# Patient Record
Sex: FEMALE | Race: WHITE | NOT HISPANIC OR LATINO | Employment: UNEMPLOYED | URBAN - NONMETROPOLITAN AREA
[De-identification: names, ages, dates, MRNs, and addresses within clinical notes are randomized per-mention and may not be internally consistent; named-entity substitution may affect disease eponyms.]

---

## 2021-01-26 ENCOUNTER — ALLIED HEALTH/NURSE VISIT (OUTPATIENT)
Dept: FAMILY MEDICINE | Facility: OTHER | Age: 21
End: 2021-01-26
Attending: FAMILY MEDICINE
Payer: COMMERCIAL

## 2021-01-26 DIAGNOSIS — R43.0 LOSS OF SMELL: Primary | ICD-10-CM

## 2021-01-26 PROCEDURE — U0003 INFECTIOUS AGENT DETECTION BY NUCLEIC ACID (DNA OR RNA); SEVERE ACUTE RESPIRATORY SYNDROME CORONAVIRUS 2 (SARS-COV-2) (CORONAVIRUS DISEASE [COVID-19]), AMPLIFIED PROBE TECHNIQUE, MAKING USE OF HIGH THROUGHPUT TECHNOLOGIES AS DESCRIBED BY CMS-2020-01-R: HCPCS | Mod: ZL | Performed by: FAMILY MEDICINE

## 2021-01-26 PROCEDURE — U0005 INFEC AGEN DETEC AMPLI PROBE: HCPCS | Mod: ZL | Performed by: FAMILY MEDICINE

## 2021-01-26 PROCEDURE — C9803 HOPD COVID-19 SPEC COLLECT: HCPCS

## 2021-01-26 NOTE — PROGRESS NOTES
Patient swabbed for COVID-19 testing.  Marycruz Wilson LPN on 1/26/2021 at 3:18 PM    Loss of smell

## 2021-01-27 LAB
LABORATORY COMMENT REPORT: NORMAL
SARS-COV-2 RNA RESP QL NAA+PROBE: NEGATIVE
SARS-COV-2 RNA RESP QL NAA+PROBE: NORMAL
SPECIMEN SOURCE: NORMAL
SPECIMEN SOURCE: NORMAL

## 2021-03-06 ENCOUNTER — HEALTH MAINTENANCE LETTER (OUTPATIENT)
Age: 21
End: 2021-03-06

## 2021-10-09 ENCOUNTER — HEALTH MAINTENANCE LETTER (OUTPATIENT)
Age: 21
End: 2021-10-09

## 2022-03-26 ENCOUNTER — HEALTH MAINTENANCE LETTER (OUTPATIENT)
Age: 22
End: 2022-03-26

## 2022-05-05 ENCOUNTER — TELEPHONE (OUTPATIENT)
Dept: OTOLARYNGOLOGY | Facility: OTHER | Age: 22
End: 2022-05-05

## 2022-05-05 ENCOUNTER — OFFICE VISIT (OUTPATIENT)
Dept: FAMILY MEDICINE | Facility: OTHER | Age: 22
End: 2022-05-05
Attending: CHIROPRACTOR
Payer: COMMERCIAL

## 2022-05-05 VITALS
TEMPERATURE: 97.8 F | OXYGEN SATURATION: 100 % | WEIGHT: 170 LBS | HEIGHT: 68 IN | BODY MASS INDEX: 25.76 KG/M2 | DIASTOLIC BLOOD PRESSURE: 76 MMHG | RESPIRATION RATE: 16 BRPM | HEART RATE: 95 BPM | SYSTOLIC BLOOD PRESSURE: 120 MMHG

## 2022-05-05 DIAGNOSIS — H66.012 ACUTE SUPPR OTITIS MEDIA W SPON RUPT EAR DRUM, LEFT EAR: Primary | ICD-10-CM

## 2022-05-05 PROCEDURE — 99203 OFFICE O/P NEW LOW 30 MIN: CPT | Performed by: PHYSICIAN ASSISTANT

## 2022-05-05 RX ORDER — AMOXICILLIN 875 MG
875 TABLET ORAL 2 TIMES DAILY
Qty: 20 TABLET | Refills: 0 | Status: SHIPPED | OUTPATIENT
Start: 2022-05-05 | End: 2022-05-15

## 2022-05-05 RX ORDER — OFLOXACIN 3 MG/ML
5 SOLUTION AURICULAR (OTIC) 2 TIMES DAILY
Qty: 4 ML | Refills: 0 | Status: SHIPPED | OUTPATIENT
Start: 2022-05-05 | End: 2022-05-12

## 2022-05-05 ASSESSMENT — PAIN SCALES - GENERAL: PAINLEVEL: MILD PAIN (3)

## 2022-05-05 NOTE — LETTER
[facility]  [address]  [place] [address]  Phone: [phone]  Fax: [phone]    May 5, 2022        Hanane Baron  [id number] [address] [address]          To whom it may concern:    RE: Hanane Baron    Patient was seen and treated today at our clinic and missed work.    Please contact me for questions or concerns.      Sincerely,        Barbara Scales PA-C

## 2022-05-05 NOTE — PROGRESS NOTES
ASSESSMENT/PLAN:    I have reviewed the nursing notes.  I have reviewed the findings, diagnosis, plan and need for follow up with the patient.    1. Acute suppr otitis media w spon rupt ear drum, left ear  - amoxicillin (AMOXIL) 875 MG tablet; Take 1 tablet (875 mg) by mouth 2 times daily for 10 days  Dispense: 20 tablet; Refill: 0  - ofloxacin (FLOXIN) 0.3 % otic solution; Place 5 drops Into the left ear 2 times daily for 7 days  Dispense: 4 mL; Refill: 0  - Otolaryngology Referral; Future  - Vital signs stable. PE consistent with OME/ear infection of left ear(s). Treatment of choice includes antibiotic regimen (Augmentin, alternating tylenol and ibuprofen every 4-6 hours as needed (if able, daily limits reviewed in AVS and verbally with patient), warm compresses, other symptomatic remedies. Avoid trauma to ear(s) such as Q-tips. If symptoms change or worsen, recommend follow up for reevaluation (high fevers, worsening pain, abnormal drainage or odor from ear, etc.). ENT referral placed. Patient is in agreement and understanding of the above treatment plan. All questions and concerns were addressed and answered to patient's satisfaction. AVS reviewed with patient.     Ear drops - Be sure to finish all the medicine, even if you feel better after a few days. When you use ear drops, you should:   Lie on your side or tilt your head, with the affected ear facing up   Make sure the ear drops go into the ear canal   Stay in this position for 20 minutes (after the ear drops are put in)     -Ibuprofen (with food) or acetaminophen as needed for pain  -You should not swim for 7 to 10 days after starting treatment. You can take a shower, but make sure to keep the ear dry. You can put some petroleum jelly (sample brand name: Vaseline) on a cotton ball, and then put the cotton ball in your outer ear, covering the opening of your ear canal. Do not push the cotton ball into the ear canal.  -You should also avoid wearing hearing  aids or headphones, or putting anything into the infected ear, until your symptoms improve.  -Avoid putting cotton swabs, fingers, or other things inside the ear   -Follow with primary provider if symptoms worsen or persist.      Discussed warning signs/symptoms indicative of need to f/u    Follow up if symptoms persist or worsen or concerns    I explained my diagnostic considerations and recommendations to the patient, who voiced understanding and agreement with the treatment plan. All questions were answered. We discussed potential side effects of any prescribed or recommended therapies, as well as expectations for response to treatments.    Barbara Scales PA-C  5/5/2022  1:06 PM    HPI:    Hanane Baron is a 21 year old female  who presents to [facility] today for concerns of left ear pain x onset last night for rupture. Ear drum rupture x 3 in this ear.     Presence of the following:   No fevers or chills.   No sore throat/pharyngitis/tonsillitis.   No allergy/URI Symptoms  Yes Muffled Sounds/Change in Hearing  Yes Sensation of Fullness in Ear(s)  No Ringing in Ears/Tinnitus  No Balance Changes  No Dizziness  No Headache   Yes Ear Drainage - yellow to bloody sensation   Additional Symptoms: No  Denies persistent hearing loss, foul smelling odor from ear, changes in vision, nausea, vomiting, diarrhea, chest pain, shortness of breath.     No Recent swimming/hot tub  No submerging of head in shower/bathtub.     No Recent URI or other illness  History of otitis media: No  History of HEENT surgery (PE tubes, tonsillectomy/adenoidectomy, etc.): No  Recent Course of ABX: No    Treatments Tried: OTC remedies  Prior History of Similar Symptoms: No    NKDA    PCP - none    Past Medical History:   Diagnosis Date     Delivery outcome of single liveborn infant     intrauterine delivery, normal spontaneous vaginal delivery, 8 pounds 5 ounces, Apgars 9 and 9     History reviewed. No pertinent surgical history.  Social  "History     Tobacco Use     Smoking status: Passive Smoke Exposure - Never Smoker     Smokeless tobacco: Never Used   Substance Use Topics     Alcohol use: Yes     Comment: Occasional     No current outpatient medications on file.     No Known Allergies  Past medical history, past surgical history, current medications and allergies reviewed and accurate to the best of my knowledge.      ROS:  Refer to HPI    /76 (BP Location: Right arm, Patient Position: Sitting, Cuff Size: Adult Regular)   Pulse 95   Temp 97.8  F (36.6  C) (Tympanic)   Resp 16   Ht 1.727 m (5' 8\")   Wt 77.1 kg (170 lb)   LMP 04/23/2022   SpO2 100%   Breastfeeding No   BMI 25.85 kg/m      EXAM:  General Appearance: Well appearing 21 year old female, appropriate appearance for age. No acute distress   Ears: Ruptured left TM, erythematous, bulging, purulence noted.  Right TM intact, translucent with bony landmarks appreciated, no erythema, no effusion, no bulging, no purulence.  Left auditory canal clear.  Right auditory canal clear.  Normal external ears, non tender.  Eyes: conjunctivae normal without erythema or irritation, corneas clear, no drainage or crusting, no eyelid swelling, pupils equal   Oropharynx: moist mucous membranes, posterior pharynx without erythema, tonsils symmetric, no erythema, no exudates or petechiae, no post nasal drip seen, no trismus, voice clear.    Sinuses:  No sinus tenderness upon palpation of the frontal or maxillary sinuses  Nose:  Bilateral nares: no erythema, no edema, no drainage or congestion   Neck: supple without adenopathy  Respiratory: normal chest wall and respirations.  Normal effort.  Clear to auscultation bilaterally, no wheezing, crackles or rhonchi.  No increased work of breathing.  No cough appreciated.  Cardiac: RRR with no murmurs   Dermatological: no rashes noted of exposed skin  Psychological: normal affect, alert, oriented, and pleasant.     Labs:  None     Xray:  None   "

## 2022-05-05 NOTE — TELEPHONE ENCOUNTER
Spoke to Hanane.  She is calling Leandra Vega for an audiology appt and will call us after that to see ENT.

## 2022-05-05 NOTE — NURSING NOTE
"Patient presents to the clinic today for a possible left eardrum rupture.  Patient states her left ear has on and off pain and drainage since yesterday.  Patient states she can just barely hear out of the ear.  Patient states this eardrum has ruptured twice before, last time she was in 5th grade.    Chief Complaint   Patient presents with     Ear Pressure     Left       Initial /76 (BP Location: Right arm, Patient Position: Sitting, Cuff Size: Adult Regular)   Pulse 95   Temp 97.8  F (36.6  C) (Tympanic)   Resp 16   Ht 1.727 m (5' 8\")   Wt 77.1 kg (170 lb)   LMP 04/23/2022   SpO2 100%   Breastfeeding No   BMI 25.85 kg/m   Estimated body mass index is 25.85 kg/m  as calculated from the following:    Height as of this encounter: 1.727 m (5' 8\").    Weight as of this encounter: 77.1 kg (170 lb).  Medication Reconciliation: complete  Aline Graham LPN    "

## 2022-05-05 NOTE — PATIENT INSTRUCTIONS
"You were prescribed an antibiotic, please take into consideration the following information:  - Take entire course of antibiotic even if you start to feel better.  - Antibiotics can cause stomach upset including nausea and diarrhea. Read your bottle or ask the pharmacist if antibiotic can be taken with food to help prevent nausea. If you have symptoms of diarrhea you can take an over-the-counter probiotic and/or increase foods with probiotics such as yogurt, [place], sauerkraut.  -Use caution in sunlight as can lead to increased risk of sunburn while on ABX (antibiotics).     Please refer to your AVS for follow up and pain/symptoms management recommendations (I.e.: medications, helpful conservative treatment modalities, appropriate follow up if need to a specialist or family practice, etc.). Please return to urgent care if your symptoms change or worsen.     Discharge instructions:  -If you were prescribed a medication(s), please take this as prescribed/directed  -Monitor your symptoms, if changing/worsening, return to UC/ER or PCP for follow up    - For ear infection. Take entire course of antibiotics to ensure this clears (even if feeling better).  - Tylenol or ibuprofen for pain and fevers.   - Eat yogurt, kefir or take over-the-counter \"probiotic\" at least 2 hours before or after a dose of antibiotic. This will replace good bacteria that may have been lost due to the antibiotic. (This may also help to prevent yeast infections and upset stomach during the course of antibiotic.)  - In the future at onset of congestion: Blow nose or use bulb syringe to keep nasal congestion cleared and use saline nasal spray/flush.  -Alternative ibuprofen and tylenol as needed.   -Rest/relaxation and keeping hydrated with clear liquids (ie: water or gatorade). Using a humidifier may be beneficial as well.     * Recheck with family practice as needed or ER sooner with worsening or concerns.     Ear drops - Be sure to finish all the " medicine, even if you feel better after a few days. When you use ear drops, you should:  Lie on your side or tilt your head, with the affected ear facing up  Make sure the ear drops go into the ear canal  Stay in this position for 20 minutes (after the ear drops are put in)     -Ibuprofen (with food) or acetaminophen as needed for pain  -You should not swim for 7 to 10 days after starting treatment. You can take a shower, but make sure to keep the ear dry. You can put some petroleum jelly (sample brand name: Vaseline) on a cotton ball, and then put the cotton ball in your outer ear, covering the opening of your ear canal. Do not push the cotton ball into the ear canal.  -You should also avoid wearing hearing aids or headphones, or putting anything into the infected ear, until your symptoms improve.  -Avoid putting cotton swabs, fingers, or other things inside the ear   -Follow with primary provider if symptoms worsen or persist.

## 2022-05-15 ENCOUNTER — TRANSFERRED RECORDS (OUTPATIENT)
Dept: HEALTH INFORMATION MANAGEMENT | Facility: CLINIC | Age: 22
End: 2022-05-15
Payer: COMMERCIAL

## 2022-05-25 ENCOUNTER — OFFICE VISIT (OUTPATIENT)
Dept: OTOLARYNGOLOGY | Facility: OTHER | Age: 22
End: 2022-05-25
Attending: NURSE PRACTITIONER
Payer: COMMERCIAL

## 2022-05-25 VITALS
HEIGHT: 68 IN | BODY MASS INDEX: 25.76 KG/M2 | OXYGEN SATURATION: 99 % | TEMPERATURE: 98.9 F | HEART RATE: 106 BPM | SYSTOLIC BLOOD PRESSURE: 126 MMHG | WEIGHT: 170 LBS | DIASTOLIC BLOOD PRESSURE: 72 MMHG

## 2022-05-25 DIAGNOSIS — H66.012 ACUTE SUPPR OTITIS MEDIA W SPON RUPT EAR DRUM, LEFT EAR: ICD-10-CM

## 2022-05-25 DIAGNOSIS — H90.12 CONDUCTIVE HEARING LOSS OF LEFT EAR WITH UNRESTRICTED HEARING OF RIGHT EAR: ICD-10-CM

## 2022-05-25 DIAGNOSIS — J35.2 ADENOID HYPERTROPHY: Primary | ICD-10-CM

## 2022-05-25 PROBLEM — U07.1 COVID-19 VIRUS DETECTED: Status: ACTIVE | Noted: 2020-09-25

## 2022-05-25 PROCEDURE — 31231 NASAL ENDOSCOPY DX: CPT | Performed by: NURSE PRACTITIONER

## 2022-05-25 PROCEDURE — 99213 OFFICE O/P EST LOW 20 MIN: CPT | Mod: 25 | Performed by: NURSE PRACTITIONER

## 2022-05-25 PROCEDURE — 92504 EAR MICROSCOPY EXAMINATION: CPT | Performed by: NURSE PRACTITIONER

## 2022-05-25 RX ORDER — AMOXICILLIN 875 MG
875 TABLET ORAL 2 TIMES DAILY
COMMUNITY
End: 2023-12-13

## 2022-05-25 RX ORDER — OFLOXACIN 3 MG/ML
5 SOLUTION AURICULAR (OTIC) 2 TIMES DAILY
COMMUNITY
End: 2023-12-13

## 2022-05-25 RX ORDER — BUDESONIDE 0.5 MG/2ML
INHALANT ORAL
Qty: 60 ML | Refills: 3 | Status: SHIPPED | OUTPATIENT
Start: 2022-05-25 | End: 2023-12-13

## 2022-05-25 ASSESSMENT — PAIN SCALES - GENERAL: PAINLEVEL: NO PAIN (0)

## 2022-05-25 NOTE — PATIENT INSTRUCTIONS
Thank you for allowing Tamara Estrada, NP and our ENT team to participate in your care.  If your medications are too expensive, please give the nurse a call.  We can possibly change this medication.  If you have a scheduling or an appointment question please contact our Health Unit Coordinator at their direct line [phone].  ALL nursing questions or concerns can be directed to your ENT Nurse: [phone]--Lay  Keep your audiogram appointment     Follow up with Dr. Pettit, we made appointment today

## 2022-05-25 NOTE — NURSING NOTE
"Chief Complaint   Patient presents with     Follow Up     HEA      Otitis Media       Initial /72 (Cuff Size: Adult Regular)   Pulse 106   Temp 98.9  F (37.2  C) (Tympanic)   Ht 1.727 m (5' 8\")   Wt 77.1 kg (170 lb)   LMP 04/23/2022   SpO2 99%   BMI 25.85 kg/m   Estimated body mass index is 25.85 kg/m  as calculated from the following:    Height as of this encounter: 1.727 m (5' 8\").    Weight as of this encounter: 77.1 kg (170 lb).  Medication Reconciliation: complete  Leeanna Graham LPN    "

## 2022-05-25 NOTE — PROGRESS NOTES
Otolaryngology Note         Chief Complaint:     Patient presents with:  Follow Up: HEA   Otitis Media           History of Present Illness:     Hanane Baron is a 21 year old female seen today for a left sided perforation.  She reports a spontaneous rupture about 3 weeks ago. + preceding URI and left upper tooth pain.  She is seeing her dentist for the tooth pain.  She was seen in walk in clinic, she was treated with amoxicillin and otics on 5/5/22.  She had decreased hearing after the rupture.  The hearing is starting to improve.  Audiogram was completed on 5/15/22    + concerns for decreased hearing  She has a history of previous perforation on the left side x 2 in the past at 3 years old and at 13 years old  No history of recurrent OM  She vapes nicotine   Patient has no history of otological surgeries or procedures  No known family history of COM or hearing loss  Currently she has mild stabbing pain that his intermittent.    She has a plugged sensation in the left ear, she has been using drops off and on at this time.    + history of noise exposure, minimal with concerts in the past  No vertigo, facial numbness or tingling.      Audiogram completed:  Tymps - high admittance with double peak, ear canal volume left wide tracing abnormal morphology normal ear canal volume  Rates normal hearing except with a mild loss at 6000 Hz, left normal slight to mild conductive hearing loss Word discrimination is 100% on right at 50 dB and 100% on left at 70 dB         Medications:     Current Outpatient Rx   Medication Sig Dispense Refill     amoxicillin (AMOXIL) 875 MG tablet Take 875 mg by mouth 2 times daily       budesonide (PULMICORT) 0.5 MG/2ML neb solution Mix 240 ml Raudel Med Sinus rinse, add 0.5 mg budesonide vial, rinse 1/2 bottle in each nostril twice daily 60 mL 3     ofloxacin (FLOXIN) 0.3 % otic solution Place 5 drops Into the left ear 2 times daily              Allergies:     Allergies: Patient has no known  "allergies.          Past Medical History:     Past Medical History:   Diagnosis Date     Delivery outcome of single liveborn infant     intrauterine delivery, normal spontaneous vaginal delivery, 8 pounds 5 ounces, Apgars 9 and 9            Past Surgical History:     History reviewed. No pertinent surgical history.    ENT family history reviewed         Social History:     Social History     Tobacco Use     Smoking status: Passive Smoke Exposure - Never Smoker     Smokeless tobacco: Never Used   Vaping Use     Vaping Use: Every day     Substances: Nicotine, Flavoring     Devices: Disposable   Substance Use Topics     Alcohol use: Yes     Comment: Occasional     Drug use: Not Currently            Review of Systems:     ROS: See HPI         Physical Exam:     /72 (Cuff Size: Adult Regular)   Pulse 106   Temp 98.9  F (37.2  C) (Tympanic)   Ht 1.727 m (5' 8\")   Wt 77.1 kg (170 lb)   LMP 04/23/2022   SpO2 99%   BMI 25.85 kg/m      General - The patient is well nourished and well developed, and appears to have good nutritional status.  Alert and oriented to person and place, answers questions and cooperates with examination appropriately.   Head and Face - Normocephalic and atraumatic, with no gross asymmetry noted.  The facial nerve is intact, with strong symmetric movements.  Voice and Breathing - The patient was breathing comfortably without the use of accessory muscles. There was no wheezing, stridor. The patients voice was clear and strong, and had appropriate pitch and quality.  Ears - External ear normal. Canals are patent.  The ears were examined under binocular microscopy and with otoscope.  The left tympanic membrane appears intact, there is no obvious effusions, slight retraction with improvement with Valsalva.  The TM is thin and atrophic.  The right tympanic membrane appears intact, due to canal overhang, I was unable to get a clear view of the entire right TM, bony landmarks were not well " visualized.  No [place] effusion or retraction.  Eyes - Extraocular movements intact, sclera were not icteric or injected, conjunctiva were pink and moist.  Mouth - Examination of the oral cavity showed pink, healthy oral mucosa. Dentition in good condition. No lesions or ulcerations noted. The tongue was mobile and midline.   Throat - The walls of the oropharynx were smooth, pink, moist, symmetric, and had no lesions or ulcerations.  The tonsillar pillars and soft palate were symmetric. The uvula was midline on elevation.    Neck - Normal midline excursion of the laryngotracheal complex during swallowing.  Full range of motion on passive movement.  Palpation of the occipital, submental, submandibular, internal jugular chain, and supraclavicular nodes did not demonstrate any abnormal lymph nodes or masses.  Palpation of the thyroid was soft and smooth, with no nodules or goiter appreciated.  The trachea was mobile and midline.  Nose - External contour is symmetric, no gross deflection or scars.  Nasal mucosa is pink and moist with no abnormal mucus.  The septum and turbinates were evaluated with nasal speculum, no polyps, masses, or purulence noted on examination.  Inferior turbinates are enlarged.    To evaluate the nose and sinuses, I performed rigid nasal endoscopy.  I sprayed both nares with 2 sprays lidocaine and neosynephrine.     I began with the RIGHT side using a 0 degree rigid nasal endoscope, and then similarly examined the LEFT side     Findings:  Septum is intact  Inferior turbinates:  enlarged  Middle turbinate and middle meatus: normal  The superior meatus is examined and unremarkable  No sphenoethmoidal purulence  Nasopharynx  has irregular shaped generous adenoid tissue.  I was unable to get a good view of the bilateral eustachian tubes due to hypertrophic adenoid tissue.  No purulence noted.  The patient tolerated the procedure well              Assessment and Plan:       ICD-10-CM    1. Adenoid  hypertrophy  J35.2 budesonide (PULMICORT) 0.5 MG/2ML neb solution   2. Acute suppr otitis media w spon rupt ear drum, left ear  H66.012 Otolaryngology Referral   3. Conductive hearing loss of left ear with unrestricted hearing of right ear  H90.12        Start Zyrtec, take as prescribed     Start Budesonide instructions  Make the Raudel Med Saline Solution using 2 packages of salt and previously boiled or distilled water.  This will make 240 ml of saline solution.  Mix the entire vial of budesonide into the solution.   Irrigate your nose 2 times a day with the warm budesonide solution using 1 bottle between nostrils in the morning, and one bottle at night.     Hearing protection encouraged    Keep your audiogram appointment     Follow up with Dr. ePttit, we made appointment today, to have Dr. Pettit take a look at her adenoid tissue and eustachian tubes.  I would also like her to confirm normal bony landmarks of both ears.    Tamara Estrada NP-C  [facility]

## 2022-05-25 NOTE — LETTER
5/25/2022         RE: Hanane Baron  [address]  [facility] [address]        Dear Colleague,    Thank you for referring your patient, Hanane Baron, to the [facility]. Please see a copy of my visit note below.    Otolaryngology Note         Chief Complaint:     Patient presents with:  Follow Up: HEA   Otitis Media           History of Present Illness:     Hanane Baron is a 21 year old female seen today for a left sided perforation.  She reports a spontaneous rupture about 3 weeks ago. + preceding URI and left upper tooth pain.  She is seeing her dentist for the tooth pain.  She was seen in walk in clinic, she was treated with amoxicillin and otics on 5/5/22.  She had decreased hearing after the rupture.  The hearing is starting to improve.  Audiogram was completed on 5/15/22    + concerns for decreased hearing  She has a history of previous perforation on the left side x 2 in the past at 3 years old and at 13 years old  No history of recurrent OM  She vapes nicotine   Patient has no history of otological surgeries or procedures  No known family history of COM or hearing loss  Currently she has mild stabbing pain that his intermittent.    She has a plugged sensation in the left ear, she has been using drops off and on at this time.    + history of noise exposure, minimal with concerts in the past  No vertigo, facial numbness or tingling.      Audiogram completed:  Tymps - high admittance with double peak, ear canal volume left wide tracing abnormal morphology normal ear canal volume  Rates normal hearing except with a mild loss at 6000 Hz, left normal slight to mild conductive hearing loss Word discrimination is 100% on right at 50 dB and 100% on left at 70 dB         Medications:     Current Outpatient Rx   Medication Sig Dispense Refill     amoxicillin (AMOXIL) 875 MG tablet Take 875 mg by mouth 2 times daily       budesonide (PULMICORT) 0.5 MG/2ML neb solution Mix 240 ml Raudel  "Med Sinus rinse, add 0.5 mg budesonide vial, rinse 1/2 bottle in each nostril twice daily 60 mL 3     ofloxacin (FLOXIN) 0.3 % otic solution Place 5 drops Into the left ear 2 times daily              Allergies:     Allergies: Patient has no known allergies.          Past Medical History:     Past Medical History:   Diagnosis Date     Delivery outcome of single liveborn infant     intrauterine delivery, normal spontaneous vaginal delivery, 8 pounds 5 ounces, Apgars 9 and 9            Past Surgical History:     History reviewed. No pertinent surgical history.    ENT family history reviewed         Social History:     Social History     Tobacco Use     Smoking status: Passive Smoke Exposure - Never Smoker     Smokeless tobacco: Never Used   Vaping Use     Vaping Use: Every day     Substances: Nicotine, Flavoring     Devices: Disposable   Substance Use Topics     Alcohol use: Yes     Comment: Occasional     Drug use: Not Currently            Review of Systems:     ROS: See HPI         Physical Exam:     /72 (Cuff Size: Adult Regular)   Pulse 106   Temp 98.9  F (37.2  C) (Tympanic)   Ht 1.727 m (5' 8\")   Wt 77.1 kg (170 lb)   LMP 04/23/2022   SpO2 99%   BMI 25.85 kg/m      General - The patient is well nourished and well developed, and appears to have good nutritional status.  Alert and oriented to person and place, answers questions and cooperates with examination appropriately.   Head and Face - Normocephalic and atraumatic, with no gross asymmetry noted.  The facial nerve is intact, with strong symmetric movements.  Voice and Breathing - The patient was breathing comfortably without the use of accessory muscles. There was no wheezing, stridor. The patients voice was clear and strong, and had appropriate pitch and quality.  Ears - External ear normal. Canals are patent.  The ears were examined under binocular microscopy and with otoscope.  The left tympanic membrane appears intact, there is no obvious " effusions, slight retraction with improvement with Valsalva.  The TM is thin and atrophic.  The right tympanic membrane appears intact, due to canal overhang, I was unable to get a clear view of the entire right TM, bony landmarks were not well visualized.  No modesto effusion or retraction.  Eyes - Extraocular movements intact, sclera were not icteric or injected, conjunctiva were pink and moist.  Mouth - Examination of the oral cavity showed pink, healthy oral mucosa. Dentition in good condition. No lesions or ulcerations noted. The tongue was mobile and midline.   Throat - The walls of the oropharynx were smooth, pink, moist, symmetric, and had no lesions or ulcerations.  The tonsillar pillars and soft palate were symmetric. The uvula was midline on elevation.    Neck - Normal midline excursion of the laryngotracheal complex during swallowing.  Full range of motion on passive movement.  Palpation of the occipital, submental, submandibular, internal jugular chain, and supraclavicular nodes did not demonstrate any abnormal lymph nodes or masses.  Palpation of the thyroid was soft and smooth, with no nodules or goiter appreciated.  The trachea was mobile and midline.  Nose - External contour is symmetric, no gross deflection or scars.  Nasal mucosa is pink and moist with no abnormal mucus.  The septum and turbinates were evaluated with nasal speculum, no polyps, masses, or purulence noted on examination.  Inferior turbinates are enlarged.    To evaluate the nose and sinuses, I performed rigid nasal endoscopy.  I sprayed both nares with 2 sprays lidocaine and neosynephrine.     I began with the RIGHT side using a 0 degree rigid nasal endoscope, and then similarly examined the LEFT side     Findings:  Septum is intact  Inferior turbinates:  enlarged  Middle turbinate and middle meatus: normal  The superior meatus is examined and unremarkable  No sphenoethmoidal purulence  Nasopharynx  has irregular shaped generous  adenoid tissue.  I was unable to get a good view of the bilateral eustachian tubes due to hypertrophic adenoid tissue.  No purulence noted.  The patient tolerated the procedure well              Assessment and Plan:       ICD-10-CM    1. Adenoid hypertrophy  J35.2 budesonide (PULMICORT) 0.5 MG/2ML neb solution   2. Acute suppr otitis media w spon rupt ear drum, left ear  H66.012 Otolaryngology Referral   3. Conductive hearing loss of left ear with unrestricted hearing of right ear  H90.12        Start Zyrtec, take as prescribed     Start Budesonide instructions  Make the Raudel Med Saline Solution using 2 packages of salt and previously boiled or distilled water.  This will make 240 ml of saline solution.  Mix the entire vial of budesonide into the solution.   Irrigate your nose 2 times a day with the warm budesonide solution using 1 bottle between nostrils in the morning, and one bottle at night.     Hearing protection encouraged    Keep your audiogram appointment     Follow up with Dr. Pettit, we made appointment today, to have Dr. Pettit take a look at her adenoid tissue and eustachian tubes.  I would also like her to confirm normal bony landmarks of both ears.    Tamara ROGERS  [facility]            Again, thank you for allowing me to participate in the care of your patient.        Sincerely,        Tamara Estrada NP

## 2022-09-17 ENCOUNTER — HEALTH MAINTENANCE LETTER (OUTPATIENT)
Age: 22
End: 2022-09-17

## 2023-05-03 ENCOUNTER — OFFICE VISIT (OUTPATIENT)
Dept: FAMILY MEDICINE | Facility: OTHER | Age: 23
End: 2023-05-03
Payer: COMMERCIAL

## 2023-05-03 VITALS
OXYGEN SATURATION: 98 % | HEART RATE: 102 BPM | RESPIRATION RATE: 16 BRPM | WEIGHT: 172 LBS | BODY MASS INDEX: 27 KG/M2 | TEMPERATURE: 97.2 F | HEIGHT: 67 IN

## 2023-05-03 DIAGNOSIS — A08.4 VIRAL GASTROENTERITIS: Primary | ICD-10-CM

## 2023-05-03 DIAGNOSIS — R11.2 NAUSEA AND VOMITING, UNSPECIFIED VOMITING TYPE: ICD-10-CM

## 2023-05-03 PROCEDURE — 99213 OFFICE O/P EST LOW 20 MIN: CPT | Performed by: NURSE PRACTITIONER

## 2023-05-03 PROCEDURE — 250N000011 HC RX IP 250 OP 636: Performed by: NURSE PRACTITIONER

## 2023-05-03 RX ORDER — ONDANSETRON 4 MG/1
8 TABLET, ORALLY DISINTEGRATING ORAL ONCE
Status: COMPLETED | OUTPATIENT
Start: 2023-05-03 | End: 2023-05-03

## 2023-05-03 RX ORDER — ONDANSETRON 4 MG/1
4 TABLET, ORALLY DISINTEGRATING ORAL EVERY 8 HOURS PRN
Qty: 10 TABLET | Refills: 0 | Status: SHIPPED | OUTPATIENT
Start: 2023-05-03 | End: 2023-05-06

## 2023-05-03 RX ADMIN — ONDANSETRON 8 MG: 4 TABLET, ORALLY DISINTEGRATING ORAL at 14:24

## 2023-05-03 ASSESSMENT — PAIN SCALES - GENERAL: PAINLEVEL: SEVERE PAIN (7)

## 2023-05-03 NOTE — PATIENT INSTRUCTIONS
Zofran for nausea,   Push fluids (gatorade) etc.   Return to ED if you think you need IV fluids in the event this continues beyond another 12-24 hours with vomiting this often.

## 2023-05-03 NOTE — PROGRESS NOTES
ASSESSMENT/PLAN:    I have reviewed the nursing notes.  I have reviewed the findings, diagnosis, plan and need for follow up with the patient.    1. Viral gastroenteritis  2. Nausea and vomiting, unspecified vomiting type  - ondansetron (ZOFRAN ODT) ODT tab 8 mg  - ondansetron (ZOFRAN ODT) 4 MG ODT tab; Take 1 tablet (4 mg) by mouth every 8 hours as needed for nausea  Dispense: 10 tablet; Refill: 0  Exam and history most consistent with viral gastroenteritis; vitals and exam do not indicate concern for dehydration that would require iv rehydration. Offered zofran, recommend pushing fluids, sips frequently of gatorade and water prn. If worsening or ongoing vomiting every 30-60 minutes for another 12-24 hours or overall worsening condition for dehydration (dizziness, syncope etc.) would recommend ER for potential IV rehydration. Patient verbalizes understanding. Declines covid test.   BRAT diet recommended once nausea is controlled     Discussed warning signs/symptoms indicative of need to f/u    Follow up if symptoms persist or worsen or concerns    I explained my diagnostic considerations and recommendations to the patient, who voiced understanding and agreement with the treatment plan. All questions were answered. We discussed potential side effects of any prescribed or recommended therapies, as well as expectations for response to treatments.    Yvette Dominguez NP  5/3/2023  2:09 PM    HPI:  Hanane Baron is a 22 year old female who [facility] for concerns of vomiting every 30 minutes that started last night suddenly and kept her up all night. She had some back pain and diarrhea last evening. No fevers. She has body aches. She has been having diarrhea today.     Vomiting all night.     No URI symptoms.     Mild abdominal pain is generalized.     Pre-.     Has not missed a period.   Last period started on 4/22/2023. Normal period.     ROS otherwise negative.       Past Medical  "History:   Diagnosis Date     Delivery outcome of single liveborn infant     intrauterine delivery, normal spontaneous vaginal delivery, 8 pounds 5 ounces, Apgars 9 and 9     History reviewed. No pertinent surgical history.  Social History     Tobacco Use     Smoking status: Passive Smoke Exposure - Never Smoker     Smokeless tobacco: Never   Vaping Use     Vaping status: Every Day     Substances: Nicotine, Flavoring     Devices: Disposable   Substance Use Topics     Alcohol use: Yes     Comment: Occasional     Current Outpatient Medications   Medication Sig Dispense Refill     ondansetron (ZOFRAN ODT) 4 MG ODT tab Take 1 tablet (4 mg) by mouth every 8 hours as needed for nausea 10 tablet 0     amoxicillin (AMOXIL) 875 MG tablet Take 875 mg by mouth 2 times daily (Patient not taking: Reported on 5/3/2023)       budesonide (PULMICORT) 0.5 MG/2ML neb solution Mix 240 ml Raudel Med Sinus rinse, add 0.5 mg budesonide vial, rinse 1/2 bottle in each nostril twice daily (Patient not taking: Reported on 5/3/2023) 60 mL 3     ofloxacin (FLOXIN) 0.3 % otic solution Place 5 drops Into the left ear 2 times daily (Patient not taking: Reported on 5/3/2023)       No Known Allergies  Past medical history, past surgical history, current medications and allergies reviewed and accurate to the best of my knowledge.      ROS:  Refer to HPI    Pulse 102   Temp 97.2  F (36.2  C) (Tympanic)   Resp 16   Ht 1.702 m (5' 7\")   Wt 78 kg (172 lb)   LMP 04/22/2023 (Exact Date)   SpO2 98%   BMI 26.94 kg/m      EXAM:  General Appearance: Well appearing 22 year old female, appropriate appearance for age. No acute distress   Respiratory: normal chest wall and respirations.  Normal effort.  Clear to auscultation bilaterally, no wheezing, crackles or rhonchi.  No increased work of breathing.  No cough appreciated.  Cardiac: RRR with no murmurs  Abdomen: soft, + mild generalized tenderness to palpation, no rigidity, no rebound tenderness or " guarding, normal bowel sounds present  :  No suprapubic tenderness to palpation.  Absent CVA tenderness to palpation.    Psychological: normal affect, alert, oriented, and pleasant.

## 2023-05-06 ENCOUNTER — HEALTH MAINTENANCE LETTER (OUTPATIENT)
Age: 23
End: 2023-05-06

## 2023-08-23 ENCOUNTER — PATIENT OUTREACH (OUTPATIENT)
Dept: FAMILY MEDICINE | Facility: OTHER | Age: 23
End: 2023-08-23
Payer: COMMERCIAL

## 2023-08-23 NOTE — TELEPHONE ENCOUNTER
Patient Quality Outreach    Patient is due for the following:   Cervical Cancer Screening - PAP Needed  Physical Preventive Adult Physical  Chlamydia Screening    Next Steps:   Schedule a Adult Preventative    Type of outreach:    Sent letter.      Questions for provider review:    None           Kalina Zaragoza        [] : The components of the vaccine(s) to be administered today are listed in the plan of care. The disease(s) for which the vaccine(s) are intended to prevent and the risks have been discussed with the caretaker.  The risks are also included in the appropriate vaccination information statements which have been provided to the patient's caregiver.  The caregiver has given consent to vaccinate. [FreeTextEntry1] : UP TO DATE

## 2023-08-23 NOTE — LETTER
[facility]  [address]  [place] [id number] [phone]       August 23, 2023    Hanane Baron  [address] [address] [address]    Dear Hanane,    We care about your health and have reviewed your health plan and are making recommendations based on this review, to optimize your health.    You are in particular need of attention regarding:  -Cervical Cancer Screening  -Wellness (Physical) Visit   -Chlamydia Screening    We are recommending that you:  -schedule a WELLNESS (Physical) APPOINTMENT with me.        -schedule a PAP SMEAR EXAM which is due.  Please disregard this reminder if you have had this exam elsewhere within the last year.  It would be helpful for us to have a copy of your recent pap smear report in our file so that we can best coordinate your care.    If you are under/uninsured, we recommend you contact the [facility]. They offer pap smears at no charge or on a sliding fee charge. You can schedule with them at [phone]. Please have them send us the results.    -Be tested for Chlamydia      Annual testing is recommended for sexually active women between the ages of 15 and 25.     Chlamydia is the most common bacterial sexually transmitted disease in the [place], according to the Centers for Disease Control (CDC), yet many women considered at risk for the disease do not get the recommended annual screening test.     Chlamydia has no symptoms and left untreated, it can cause infertility and other serious health problems. Chlamydia is easily cured with antibiotics.  We have enclosed a brochure that gives you additional information about Chlamydia.    Testing is now usually done by leaving a urine sample with a lab-only appointment or you can make an office visit if you have other concerns. (If you are not recently or currently sexually active, then please contact us so we can update your medical record.)      In addition, here is a list of  due or overdue Health Maintenance reminders.    Health Maintenance Due   Topic Date Due    Talk to your care team about options to quit tobacco use.  Never done    Yearly Preventive Visit  Never done    Chlamydia Screening  Never done    Discuss Advance Care Planning  Never done    COVID-19 Vaccine (1) Never done    HIV Screening  Never done    Hepatitis C Screening  Never done    PAP Smear  Never done       To address the above recommendations, we encourage you to contact us at [phone]. They will assist you with finding the most convenient time and location.    Thank you for trusting [facility] and we appreciate the opportunity to serve you.  We look forward to supporting your healthcare needs in the future.    Healthy Regards,    Your [facility] Team

## 2023-12-11 ENCOUNTER — OFFICE VISIT (OUTPATIENT)
Dept: FAMILY MEDICINE | Facility: OTHER | Age: 23
End: 2023-12-11
Payer: COMMERCIAL

## 2023-12-11 VITALS
TEMPERATURE: 97.7 F | RESPIRATION RATE: 16 BRPM | HEART RATE: 82 BPM | SYSTOLIC BLOOD PRESSURE: 120 MMHG | WEIGHT: 181.5 LBS | BODY MASS INDEX: 28.49 KG/M2 | HEIGHT: 67 IN | OXYGEN SATURATION: 100 % | DIASTOLIC BLOOD PRESSURE: 82 MMHG

## 2023-12-11 DIAGNOSIS — B37.31 VAGINAL YEAST INFECTION: ICD-10-CM

## 2023-12-11 DIAGNOSIS — N91.2 AMENORRHEA: Primary | ICD-10-CM

## 2023-12-11 DIAGNOSIS — Z72.51 UNPROTECTED SEXUAL INTERCOURSE: ICD-10-CM

## 2023-12-11 DIAGNOSIS — B96.89 BV (BACTERIAL VAGINOSIS): ICD-10-CM

## 2023-12-11 DIAGNOSIS — N76.0 BV (BACTERIAL VAGINOSIS): ICD-10-CM

## 2023-12-11 LAB
BACTERIAL VAGINOSIS VAG-IMP: POSITIVE
C TRACH DNA SPEC QL PROBE+SIG AMP: NEGATIVE
CANDIDA DNA VAG QL NAA+PROBE: DETECTED
CANDIDA GLABRATA / CANDIDA KRUSEI DNA: NOT DETECTED
HCG UR QL: NEGATIVE
N GONORRHOEA DNA SPEC QL NAA+PROBE: NEGATIVE
T VAGINALIS DNA VAG QL NAA+PROBE: NOT DETECTED

## 2023-12-11 PROCEDURE — 0352U MULTIPLEX VAGINAL PANEL BY PCR: CPT | Mod: ZL | Performed by: NURSE PRACTITIONER

## 2023-12-11 PROCEDURE — 87491 CHLMYD TRACH DNA AMP PROBE: CPT | Mod: ZL | Performed by: NURSE PRACTITIONER

## 2023-12-11 PROCEDURE — 99214 OFFICE O/P EST MOD 30 MIN: CPT | Performed by: NURSE PRACTITIONER

## 2023-12-11 PROCEDURE — 81025 URINE PREGNANCY TEST: CPT | Mod: ZL | Performed by: NURSE PRACTITIONER

## 2023-12-11 PROCEDURE — 87591 N.GONORRHOEAE DNA AMP PROB: CPT | Mod: ZL | Performed by: NURSE PRACTITIONER

## 2023-12-11 ASSESSMENT — PAIN SCALES - GENERAL: PAINLEVEL: NO PAIN (0)

## 2023-12-12 ENCOUNTER — TELEPHONE (OUTPATIENT)
Dept: FAMILY MEDICINE | Facility: OTHER | Age: 23
End: 2023-12-12
Payer: COMMERCIAL

## 2023-12-12 DIAGNOSIS — N76.0 BV (BACTERIAL VAGINOSIS): Primary | ICD-10-CM

## 2023-12-12 DIAGNOSIS — B37.31 CANDIDIASIS OF VAGINA: ICD-10-CM

## 2023-12-12 DIAGNOSIS — B96.89 BV (BACTERIAL VAGINOSIS): Primary | ICD-10-CM

## 2023-12-12 RX ORDER — FLUCONAZOLE 150 MG/1
150 TABLET ORAL
Qty: 2 TABLET | Refills: 0 | Status: SHIPPED | OUTPATIENT
Start: 2023-12-12 | End: 2023-12-16

## 2023-12-12 RX ORDER — METRONIDAZOLE 500 MG/1
500 TABLET ORAL 2 TIMES DAILY
Qty: 14 TABLET | Refills: 0 | Status: SHIPPED | OUTPATIENT
Start: 2023-12-12 | End: 2023-12-19

## 2023-12-12 NOTE — TELEPHONE ENCOUNTER
Left voicemail regarding updated results.  Prescribed metronidazole twice a day for 7 days for the bacterial vaginosis.  Two doses of Diflucan one to take at the start of antibiotics and then a second dose three days later.  Left phone number for [facility] call back with any questions.

## 2023-12-12 NOTE — NURSING NOTE
"Chief Complaint   Patient presents with    Amenorrhea       Initial /82 (BP Location: Right arm, Patient Position: Chair, Cuff Size: Adult Regular)   Pulse 82   Temp 97.7  F (36.5  C) (Tympanic)   Resp 16   Ht 1.702 m (5' 7\")   Wt 82.3 kg (181 lb 8 oz)   LMP 10/12/2023 (Approximate)   SpO2 100%   BMI 28.43 kg/m   Estimated body mass index is 28.43 kg/m  as calculated from the following:    Height as of this encounter: 1.702 m (5' 7\").    Weight as of this encounter: 82.3 kg (181 lb 8 oz).    FOOD SECURITY SCREENING QUESTIONS:    The next two questions are to help us understand your food security.  If you are feeling you need any assistance in this area, we have resources available to support you today.    Hunger Vital Signs:  Within the past 12 months we worried whether our food would run out before we got money to buy more. Never  Within the past 12 months the food we bought just didn't last and we didn't have money to get more. Never    Medication Reconciliation: Complete.       Lillian Ayala LPN on 12/11/2023 at 6:57 PM     "

## 2023-12-12 NOTE — PROGRESS NOTES
ASSESSMENT/PLAN:     I have reviewed the nursing notes.  I have reviewed the findings, diagnosis, plan and need for follow up with the patient.        1. Amenorrhea    - Pregnancy, Urine (HCG)  - GC/Chlamydia by PCR  - Multiplex Vaginal Panel by PCR    2. Unprotected sexual intercourse    - Pregnancy, Urine (HCG)  - GC/Chlamydia by PCR  - Multiplex Vaginal Panel by PCR    Urinary pregnancy test is negative.    Negative Gonorrhea PCR test  Negative Chlamydia PCR test    Discussed with patient that refusal to use condoms or birth control is high risk of unplanned pregnancy occurrence.  Recommend use of condoms and/or birth control use if she does not desire pregnancy.     Recommend rechecking pregnancy testing ing 1 to 2 weeks     3. BV (bacterial vaginosis)    Vaginal multiplex PCR testing:  Positive for bacterial vaginosis    metroNIDAZOLE 500 mg Oral 2 TIMES DAILY  Take 1 tablet (500 mg) by mouth 2 times daily for 7 days, Disp-14 tablet, R-0, E-Prescribe   Dispense: 14 tablet   Refills: 0 ordered   Pharmacy: [facility], [place] [address] [address] AT [facility] (Ph: [phone])       4. Vaginal yeast infection    Fluconazole 150 mg Oral EVERY 72 HOURS   Take 1 tablet (150 mg) by mouth every 72 hours for 2 doses, Disp-2 tablet, R-0, E-Prescribe   Dispense: 2 tablet   Refills: 0 ordered   Pharmacy: [facility], [place] - [address] AT [facility] (Ph: [phone])   Order Details  Ordered on: 12/12/23   Associated Dx: Candidiasis of vagina   End date: 12/16/23   Authorizing provider: Tamara German PA-C       Discussed warning signs/symptoms indicative of need to f/u  Follow up if symptoms persist or worsen or concerns      I explained my diagnostic considerations and recommendations to the patient, who voiced understanding and agreement with the treatment plan. All questions were answered. We discussed  potential side effects of any prescribed or recommended therapies, as well as expectations for response to treatments.    Juany Power NP  [facility] [place]      SUBJECTIVE:   Hanane Baron is a 23 year old female who presents to clinic today for the following health issues:  Pregnancy test    HPI  History of being pregnant - No   Home pregnancy test - yes but reports all were negative x 6 last test was 2 days ago  LMP - 10/12/23  Current symptoms - missed menses, nausea, bloating, mild breast tenderness  Birth control use - no birth control and no condom use  Planned pregnancy - unplanned  Taking a prenatal multivitamin - no   Concerns about STDs - no concerns, single male partner but is ok with testing today  Using tobacco - vaping   Using alcohol - yes   Using recreational drugs - yes marijuana   Fevers - no   Abdominal pain - intermittent cramping   Vaginal symptoms, bleeding, spotting, discharge - no   Urinary symptoms, frequency, dysuria, hematuria - no           Past Medical History:   Diagnosis Date    Delivery outcome of single liveborn infant     intrauterine delivery, normal spontaneous vaginal delivery, 8 pounds 5 ounces, Apgars 9 and 9     History reviewed. No pertinent surgical history.  Social History     Tobacco Use    Smoking status: Passive Smoke Exposure - Never Smoker    Smokeless tobacco: Never   Substance Use Topics    Alcohol use: Yes     Comment: Occasional     Current Outpatient Medications   Medication Sig Dispense Refill    amoxicillin (AMOXIL) 875 MG tablet Take 875 mg by mouth 2 times daily (Patient not taking: Reported on 5/3/2023)      budesonide (PULMICORT) 0.5 MG/2ML neb solution Mix 240 ml Raudel Med Sinus rinse, add 0.5 mg budesonide vial, rinse 1/2 bottle in each nostril twice daily (Patient not taking: Reported on 5/3/2023) 60 mL 3    ofloxacin (FLOXIN) 0.3 % otic solution Place 5 drops Into the left ear 2 times daily (Patient not taking: Reported on 5/3/2023)  "      No Known Allergies      Past medical history, past surgical history, current medications and allergies reviewed and accurate to the best of my knowledge.        OBJECTIVE:     /82 (BP Location: Right arm, Patient Position: Chair, Cuff Size: Adult Regular)   Pulse 82   Temp 97.7  F (36.5  C) (Tympanic)   Resp 16   Ht 1.702 m (5' 7\")   Wt 82.3 kg (181 lb 8 oz)   LMP 10/12/2023 (Approximate)   SpO2 100%   BMI 28.43 kg/m    Body mass index is 28.43 kg/m .      Physical Exam  General Appearance: Well appearing adult female, appropriate appearance for age. No acute distress  Respiratory: normal chest wall and respirations.  Normal effort.  No cough appreciated.  Abdomen: soft, nontender, no rigidity, no rebound tenderness or guarding, normal bowel sounds present  :  No suprapubic tenderness to palpation.  Normal appearing external female genitale.  Vaginal canal with thick white discharge.  No cervical motion tenderness.    Musculoskeletal:  Equal movement of bilateral upper extremities.  Equal movement of bilateral lower extremities.  Normal gait.    Psychological: normal affect, alert, oriented, and pleasant.       Labs:  Results for orders placed or performed in visit on 12/11/23   Pregnancy, Urine (HCG)     Status: Normal   Result Value Ref Range    hCG Urine Qualitative Negative Negative   GC/Chlamydia by PCR     Status: Normal    Specimen: Vagina; Swab   Result Value Ref Range    Chlamydia Trachomatis Negative Negative    Neisseria gonorrhoeae Negative Negative    Narrative    Assay performed using Needl real-time, reverse-transcriptase PCR.   Multiplex Vaginal Panel by PCR     Status: Abnormal    Specimen: Vagina; Swab   Result Value Ref Range    Bacterial Vaginosis Organism DNA Positive (A) Negative    Candida Group DNA Detected (A) Not Detected    Candida glabrata / Naomy krusei DNA Not Detected Not Detected    Trichomonas vaginalis DNA Not Detected Not Detected    Narrative    The " Xpert  Xpress MVP test, performed on the [employer], is an automated, qualitative in vitro diagnostic test for the detection of DNA targets from anaerobic bacteria associated with bacterial vaginosis, Candida species associated with vulvovaginal candidiasis, and Trichomonas vaginalis. The assay uses clinician-collected and self-collected vaginal swabs from patients who are symptomatic for vaginitis/ vaginosis. The Xpert  Xpress MVP test utilizes real-time polymerase chain reaction (PCR) for the amplification of specific DNA targets and utilizes fluorogenic target-specific hybridization probes to detect and differentiate DNA. It is intended to aid in the diagnosis of vaginal infections in women with a clinical presentation consistent with bacterial vaginosis, vulvovaginal candidiasis, or trichomoniasis.   The assay targets three anaerobic microorgansims that are associated with bacterial vaginosis (BV). Other organisms that are not detected by the Xpert  Xpress MVP test have also been reported to be associated with BV. The BV organism and Candida species targets of the Xpert  Xpress MVP test can be commensal in women; positive results must be considered in conjunction with other clinical and patient information to determine the disease status.

## 2024-01-29 ENCOUNTER — OFFICE VISIT (OUTPATIENT)
Dept: FAMILY MEDICINE | Facility: OTHER | Age: 24
End: 2024-01-29
Attending: STUDENT IN AN ORGANIZED HEALTH CARE EDUCATION/TRAINING PROGRAM
Payer: COMMERCIAL

## 2024-01-29 VITALS
DIASTOLIC BLOOD PRESSURE: 82 MMHG | WEIGHT: 183.4 LBS | SYSTOLIC BLOOD PRESSURE: 126 MMHG | HEART RATE: 91 BPM | BODY MASS INDEX: 28.72 KG/M2 | TEMPERATURE: 98 F | OXYGEN SATURATION: 99 % | RESPIRATION RATE: 18 BRPM

## 2024-01-29 DIAGNOSIS — R05.1 ACUTE COUGH: ICD-10-CM

## 2024-01-29 DIAGNOSIS — H66.002 NON-RECURRENT ACUTE SUPPURATIVE OTITIS MEDIA OF LEFT EAR WITHOUT SPONTANEOUS RUPTURE OF TYMPANIC MEMBRANE: ICD-10-CM

## 2024-01-29 DIAGNOSIS — J02.9 SORE THROAT: Primary | ICD-10-CM

## 2024-01-29 LAB
FLUAV RNA SPEC QL NAA+PROBE: NEGATIVE
FLUBV RNA RESP QL NAA+PROBE: NEGATIVE
GROUP A STREP BY PCR: NOT DETECTED
RSV RNA SPEC NAA+PROBE: NEGATIVE
SARS-COV-2 RNA RESP QL NAA+PROBE: NEGATIVE

## 2024-01-29 PROCEDURE — 87637 SARSCOV2&INF A&B&RSV AMP PRB: CPT | Mod: ZL | Performed by: NURSE PRACTITIONER

## 2024-01-29 PROCEDURE — 87651 STREP A DNA AMP PROBE: CPT | Mod: ZL | Performed by: NURSE PRACTITIONER

## 2024-01-29 PROCEDURE — 99213 OFFICE O/P EST LOW 20 MIN: CPT | Performed by: NURSE PRACTITIONER

## 2024-01-29 RX ORDER — AMOXICILLIN 875 MG
875 TABLET ORAL 2 TIMES DAILY
Qty: 14 TABLET | Refills: 0 | Status: SHIPPED | OUTPATIENT
Start: 2024-01-29 | End: 2024-02-05

## 2024-01-29 RX ORDER — BENZONATATE 100 MG/1
100 CAPSULE ORAL 3 TIMES DAILY PRN
Qty: 42 CAPSULE | Refills: 0 | Status: SHIPPED | OUTPATIENT
Start: 2024-01-29

## 2024-01-29 ASSESSMENT — ENCOUNTER SYMPTOMS
CARDIOVASCULAR NEGATIVE: 1
COUGH: 1
ACTIVITY CHANGE: 1
SHORTNESS OF BREATH: 0
GASTROINTESTINAL NEGATIVE: 1
SORE THROAT: 1
CHILLS: 1
FATIGUE: 1
FEVER: 0
HEADACHES: 1

## 2024-01-29 ASSESSMENT — PAIN SCALES - GENERAL: PAINLEVEL: MILD PAIN (3)

## 2024-01-29 NOTE — NURSING NOTE
Patient presents to clinic for concern of cough  /82   Pulse 91   Temp 98  F (36.7  C) (Temporal)   Resp 18   Wt 83.2 kg (183 lb 6.4 oz)   LMP 10/12/2023 (Approximate)   SpO2 99%   BMI 28.72 kg/m    Rosy Solo LPN on 1/29/2024 at 5:52 PM

## 2024-01-29 NOTE — PROGRESS NOTES
Hanane DELA CRUZ Baron  2000    ASSESSMENT/PLAN:   1. Sore throat  - Group A Streptococcus PCR Throat Swab  Strep test returned negative    2. Acute cough  - Symptomatic Influenza A/B, RSV, & SARS-CoV2 PCR (COVID-19) Nose  - benzonatate (TESSALON) 100 MG capsule; Take 1 capsule (100 mg) by mouth 3 times daily as needed for cough  Dispense: 42 capsule; Refill: 0  Patient tested negative for influenza, RSV and COVID-19.  Recommend symptomatic care and over-the-counter remedies to help target upper respiratory symptoms.  Prescription for Tessalon Perles sent to pharmacy to use as needed for cough.  Work note provided to excuse her from work tomorrow.  If symptoms are worsening or persisting, she develops wheezing, shortness of breath or develops a fever at this point in her course of illness she knows to return for further evaluation.    3. Non-recurrent acute suppurative otitis media of left ear without spontaneous rupture of tympanic membrane  - amoxicillin (AMOXIL) 875 MG tablet; Take 1 tablet (875 mg) by mouth 2 times daily for 7 days  Dispense: 14 tablet; Refill: 0   Left-sided ear pain intermittently over the past week and a half, this is progressively worsening.  She is afebrile.  Left TM is bulging with effusion, injected, erythematous.  History of recurrent acute otitis media with TM rupture.  I would recommend initiating antibiotic treatment for left acute otitis media without TM rupture.  High-dose amoxicillin twice daily for 7 days.  May use Tylenol, ibuprofen and warm compress as needed for pain.    Patient agrees with plan of care and verbalizes understating. AVS printed. Patient education provided verbally and written instructions provided as requested. Patient made aware of emergent signs and symptoms to monitor for and when to seek additional care/follow up.     SUBJECTIVE:   CHIEF COMPLAINT/ REASON FOR VISIT  Patient presents with:  Cough     HISTORY OF PRESENT ILLNESS  Hanane Baron is a pleasant 23  year old female presents to [facility] clinic today accompanied by her friend with concern for ongoing upper respiratory symptoms.  She states she works in a  and is post many illnesses, she states she has been sick for several months.  Most recently she has been sick for the past week and a half.  She has persistent cough, congestion, sore throat, headache and her left ear feels plugged.  She is not having hot and cold flashes, no known fever.  She is eating and drinking normally.    I have reviewed the nursing notes.  I have reviewed allergies, medication list, problem list, and past medical history.    REVIEW OF SYSTEMS  Review of Systems   Constitutional:  Positive for activity change, chills and fatigue. Negative for fever.   HENT:  Positive for congestion, ear pain and sore throat.    Respiratory:  Positive for cough. Negative for shortness of breath.    Cardiovascular: Negative.    Gastrointestinal: Negative.    Neurological:  Positive for headaches.   All other systems reviewed and are negative.       VITAL SIGNS  Vitals:    01/29/24 1752   BP: 126/82   Pulse: 91   Resp: 18   Temp: 98  F (36.7  C)   TempSrc: Temporal   SpO2: 99%   Weight: 83.2 kg (183 lb 6.4 oz)      Body mass index is 28.72 kg/m .    OBJECTIVE:   PHYSICAL EXAM  Physical Exam  Vitals reviewed.   Constitutional:       Appearance: Normal appearance. She is ill-appearing. She is not toxic-appearing or diaphoretic.   HENT:      Head: Normocephalic and atraumatic.      Right Ear: Tympanic membrane, ear canal and external ear normal.      Left Ear: Ear canal and external ear normal. Swelling and tenderness present. A middle ear effusion is present. Tympanic membrane is injected, scarred, erythematous and bulging.      Nose: Congestion and rhinorrhea present.      Mouth/Throat:      Lips: Pink.      Pharynx: Posterior oropharyngeal erythema present. No oropharyngeal exudate.      Tonsils: No tonsillar exudate. 2+ on the right. 2+ on the left.    Eyes:      Conjunctiva/sclera: Conjunctivae normal.   Cardiovascular:      Rate and Rhythm: Normal rate and regular rhythm.      Pulses: Normal pulses.      Heart sounds: Normal heart sounds. No murmur heard.  Pulmonary:      Effort: Pulmonary effort is normal.      Breath sounds: Normal breath sounds. No wheezing or rhonchi.   Musculoskeletal:      Cervical back: Neck supple.   Lymphadenopathy:      Cervical: Cervical adenopathy present.   Skin:     Capillary Refill: Capillary refill takes less than 2 seconds.      Findings: No rash.   Neurological:      General: No focal deficit present.      Mental Status: She is alert and oriented to person, place, and time.   Psychiatric:         Mood and Affect: Mood normal.         Behavior: Behavior normal.         Thought Content: Thought content normal.         Judgment: Judgment normal.          DIAGNOSTICS  Results for orders placed or performed in visit on 01/29/24   Symptomatic Influenza A/B, RSV, & SARS-CoV2 PCR (COVID-19) Nose     Status: Normal    Specimen: Nose; Swab   Result Value Ref Range    Influenza A PCR Negative Negative    Influenza B PCR Negative Negative    RSV PCR Negative Negative    SARS CoV2 PCR Negative Negative    Narrative    Testing was performed using the Xpert Xpress CoV2/Flu/RSV Assay on the [employer] GeneXpert Instrument. This test should be ordered for the detection of SARS-CoV-2, influenza, and RSV viruses in individuals who meet clinical and/or epidemiological criteria. Test performance is unknown in asymptomatic patients. This test is for in vitro diagnostic use under the FDA EUA for laboratories certified under CLIA to perform high or moderate complexity testing. This test has not been FDA cleared or approved. A negative result does not rule out the presence of PCR inhibitors in the specimen or target RNA in concentration below the limit of detection for the assay. If only one viral target is positive but coinfection with multiple targets  is suspected, the sample should be re-tested with another FDA cleared, approved, or authorized test, if coinfection would change clinical management. This test was validated by the [facility]. These laboratories are certified under the Clinical Laboratory Improvement Amendments of 1988 (CLIA-88) as qualified to perform high complexity laboratory testing.   Group A Streptococcus PCR Throat Swab     Status: Normal    Specimen: Throat; Swab   Result Value Ref Range    Group A strep by PCR Not Detected Not Detected    Narrative    The Xpert Xpress Strep A test, performed on the [employer]  Instrument Systems, is a rapid, qualitative in vitro diagnostic test for the detection of Streptococcus pyogenes (Group A ß-hemolytic Streptococcus, Strep A) in throat swab specimens from patients with signs and symptoms of pharyngitis. The Xpert Xpress Strep A test can be used as an aid in the diagnosis of Group A Streptococcal pharyngitis. The assay is not intended to monitor treatment for Group A Streptococcus infections. The Xpert Xpress Strep A test utilizes an automated real-time polymerase chain reaction (PCR) to detect Streptococcus pyogenes DNA.        Estefania Corcoran NP  [facility]

## 2024-01-30 NOTE — PATIENT INSTRUCTIONS
Adequate rest, staying hydrated with water and electrolyte replacements and symptomatic care is recommended for treatment of viral illness  Over the counter remedies for upper respiratory symptoms:    Sinus congestion/pressure & runny nose     Flonase nasal spray, twice daily.  This is an intranasal steroid.  This will help with sinus congestion and postnasal drip. This is safe to use through the length of your symptoms.  Afrin nasal spray is also an option.  This is a little bit stronger. I would not recommend using this longer than 3 days as this and cause rebound congestion.  Consider picking up a daily nondrowsy antihistamine such as Allegra, Claritin or Zyrtec.  I would recommend taking this once daily for the next 3-5 days to help with symptoms. This can help with sinus drainage and  nasal congestion/pressure. Improving postnasal drip can help reduce sore throat irritation and reduce drainage causing a cough.  Stay well hydrated. Push water. If you need to flavor the water that is ok.  Caffeine does not help with dehydration, this actually worsens dehydration.  Avoid pop and coffee.  Adequate rest.  Your body needs time to recover to fight off this infection.  Tylenol (acetaminophen) and NSAIDS (Aleve, Advil, ibuprofen) as needed for pain, discomfort, sore throat, headache, fever, chills or body aches.  Ibuprofen 400 mg-600 mg every 6 hours as needed pain/headache or fever.  Acetaminophen  (Tylenol) 500 mg or 650 mg every 4 hours.  For severe pain may use extra strength acetaminophen 1000 mg every 8 hours.

## 2024-02-15 ENCOUNTER — E-VISIT (OUTPATIENT)
Dept: URGENT CARE | Facility: CLINIC | Age: 24
End: 2024-02-15
Payer: COMMERCIAL

## 2024-02-15 DIAGNOSIS — B30.9 VIRAL CONJUNCTIVITIS: Primary | ICD-10-CM

## 2024-02-15 PROCEDURE — 99207 PR NON-BILLABLE SERV PER CHARTING: CPT | Performed by: FAMILY MEDICINE

## 2024-02-16 ENCOUNTER — OFFICE VISIT (OUTPATIENT)
Dept: FAMILY MEDICINE | Facility: OTHER | Age: 24
End: 2024-02-16
Attending: NURSE PRACTITIONER
Payer: COMMERCIAL

## 2024-02-16 VITALS
WEIGHT: 178 LBS | TEMPERATURE: 97.4 F | OXYGEN SATURATION: 99 % | DIASTOLIC BLOOD PRESSURE: 80 MMHG | SYSTOLIC BLOOD PRESSURE: 124 MMHG | HEART RATE: 87 BPM | RESPIRATION RATE: 16 BRPM | HEIGHT: 67 IN | BODY MASS INDEX: 27.94 KG/M2

## 2024-02-16 DIAGNOSIS — H65.92 OME (OTITIS MEDIA WITH EFFUSION), LEFT: ICD-10-CM

## 2024-02-16 DIAGNOSIS — H10.32 ACUTE BACTERIAL CONJUNCTIVITIS OF LEFT EYE: Primary | ICD-10-CM

## 2024-02-16 PROCEDURE — 99213 OFFICE O/P EST LOW 20 MIN: CPT | Performed by: NURSE PRACTITIONER

## 2024-02-16 RX ORDER — POLYMYXIN B SULFATE AND TRIMETHOPRIM 1; 10000 MG/ML; [USP'U]/ML
1-2 SOLUTION OPHTHALMIC 4 TIMES DAILY
Qty: 10 ML | Refills: 0 | Status: SHIPPED | OUTPATIENT
Start: 2024-02-16 | End: 2024-02-21

## 2024-02-16 ASSESSMENT — PAIN SCALES - GENERAL: PAINLEVEL: SEVERE PAIN (6)

## 2024-02-16 NOTE — PROGRESS NOTES
ASSESSMENT/PLAN:     I have reviewed the nursing notes.  I have reviewed the findings, diagnosis, plan and need for follow up with the patient.          1. Acute bacterial conjunctivitis of left eye    - polymixin b-trimethoprim (POLYTRIM) 92053-7.1 UNIT/ML-% ophthalmic solution; Place 1-2 drops Into the left eye 4 times daily for 5 days  Dispense: 10 mL; Refill: 0    Symptomatic treatment - moist compresses, avoid touching or rubbing eyes, hand hygiene, etc    2. OME (otitis media with effusion), left    Treated with Amoxicillin 875 mg x 7 days for left AOM on 1/29/24.    Patient concerned infection maybe persisting.  Exam consistent with persisting effusion but infection resolved.  May use over-the-counter Tylenol or ibuprofen PRN    Follow up if symptoms persist or worsen or concerns      I explained my diagnostic considerations and recommendations to the patient, who voiced understanding and agreement with the treatment plan. All questions were answered. We discussed potential side effects of any prescribed or recommended therapies, as well as expectations for response to treatments.    Juany Power NP  [facility] AND [facility]      SUBJECTIVE:   Hanane Baron is a 23 year old female who presents to clinic today for the following health issues:  Pink eye      HPI  Left eye with redness, irritated, draining and crusting since yesterday. Wears glasses, no contact lenses.  No fevers.  Left ear with crackles and mild discomfort.  Recent left ear infection that was treated with Amoxicillin.  She is concerned maybe ear infection has not completely resolved.  Negative strep test 1/29/24.  Negative viral PCR testing on 1/29/24.    Mild persisting throat discomfort and cough.  No breathing difficulty.    No nasal drainage/congestion.      Past Medical History:   Diagnosis Date    Delivery outcome of single liveborn infant     intrauterine delivery, normal spontaneous vaginal delivery, 8 pounds 5 ounces,  "Apgars 9 and 9     History reviewed. No pertinent surgical history.  Social History     Tobacco Use    Smoking status: Passive Smoke Exposure - Never Smoker    Smokeless tobacco: Never   Substance Use Topics    Alcohol use: Yes     Comment: Occasional     Current Outpatient Medications   Medication Sig Dispense Refill    benzonatate (TESSALON) 100 MG capsule Take 1 capsule (100 mg) by mouth 3 times daily as needed for cough (Patient not taking: Reported on 2/16/2024) 42 capsule 0     No Known Allergies      Past medical history, past surgical history, current medications and allergies reviewed and accurate to the best of my knowledge.        OBJECTIVE:     /80 (BP Location: Right arm, Patient Position: Sitting, Cuff Size: Adult Regular)   Pulse 87   Temp 97.4  F (36.3  C) (Tympanic)   Resp 16   Ht 1.702 m (5' 7\")   Wt 80.7 kg (178 lb)   LMP 01/12/2024 (Approximate)   SpO2 99%   BMI 27.88 kg/m    Body mass index is 27.88 kg/m .        Physical Exam  General Appearance: Well appearing adult female, appropriate appearance for age. No acute distress  Ears: Left TM intact, bony landmarks visible, no erythema, dull effusion with bulging, no purulence.  Right TM intact, no erythema, no effusion, no bulging, no purulence.  Left auditory canal clear without drainage or bleeding.  Right auditory canal clear without drainage or bleeding.  Normal external ears, non tender.  Eyes: left bulbar conjunctivae with erythema and irritation, cornea clear, yellow drainage and crusting, no eyelid swelling or erythema.   Right bulbar conjunctivae normal without erythema or irritation, cornea clear, no drainage or crusting, no eyelid swelling or erythema.  PERRLA. Normal EOMs.  Orophayrnx: voice clear.    Nose:  No noted drainage   Neck: supple without adenopathy  Respiratory: normal chest wall and respirations.  Normal effort.  Clear to auscultation bilaterally, no wheezing, crackles or rhonchi.  No increased work of " breathing.  No cough appreciated.  Cardiac: RRR with no murmurs  Musculoskeletal:  Equal movement of bilateral upper extremities.  Equal movement of bilateral lower extremities.  Normal gait.    Psychological: normal affect, alert, oriented, and pleasant.

## 2024-02-16 NOTE — NURSING NOTE
"Chief Complaint   Patient presents with    Conjunctivitis     X1 day      Patient here for evaluation of left eye for pink eye. States that eye is red and was very gunky this morning.     She would also like her left ear looked at. She recently had an ear infection and is still hearing cracking noises.     Initial /80 (BP Location: Right arm, Patient Position: Sitting, Cuff Size: Adult Regular)   Pulse 87   Temp 97.4  F (36.3  C) (Tympanic)   Resp 16   Ht 1.702 m (5' 7\")   Wt 80.7 kg (178 lb)   LMP 01/12/2024 (Approximate)   SpO2 99%   BMI 27.88 kg/m   Estimated body mass index is 27.88 kg/m  as calculated from the following:    Height as of this encounter: 1.702 m (5' 7\").    Weight as of this encounter: 80.7 kg (178 lb).  Medication Review: complete    The next two questions are to help us understand your food security.  If you are feeling you need any assistance in this area, we have resources available to support you today.          2/16/2024   SDOH- Food Insecurity   Within the past 12 months, did you worry that your food would run out before you got money to buy more? N   Within the past 12 months, did the food you bought just not last and you didn t have money to get more? N         Health Care Directive:  Patient does not have a Health Care Directive or Living Will: Discussed advance care planning with patient; however, patient declined at this time.    Wen Navarro, LPN      "

## 2024-02-16 NOTE — PATIENT INSTRUCTIONS
Pinkeye: Care Instructions  Overview     Pinkeye is redness and swelling of the eye surface and the conjunctiva (the lining of the eyelid and the covering of the white part of the eye). Pinkeye is also called conjunctivitis. Pinkeye is often caused by infection with bacteria or a virus. Dry air, allergies, smoke, and chemicals are other common causes.  Pinkeye often gets better on its own in 7 to 10 days. Antibiotics only help if the pinkeye is caused by bacteria. Pinkeye caused by infection spreads easily. If an allergy or chemical is causing pinkeye, it will not go away unless you can avoid whatever is causing it.  Follow-up care is a key part of your treatment and safety. Be sure to make and go to all appointments, and call your doctor if you are having problems. It's also a good idea to know your test results and keep a list of the medicines you take.  How can you care for yourself at home?  Wash your hands often. Always wash them before and after you treat pinkeye or touch your eyes or face.  Use moist cotton or a clean, wet cloth to remove crust. Wipe from the inside corner of the eye to the outside. Use a clean part of the cloth for each wipe.  Put cold or warm wet cloths on your eye a few times a day if the eye hurts.  Do not wear contact lenses or eye makeup until the pinkeye is gone. Throw away any eye makeup you were using when you got pinkeye. Clean your contacts and storage case. If you wear disposable contacts, use a new pair when your eye has cleared and it is safe to wear contacts again.  If the doctor gave you antibiotic ointment or eyedrops, use them as directed. Use the medicine for as long as instructed, even if your eye starts looking better soon. Keep the bottle tip clean, and do not let it touch the eye area.  To put in eyedrops or ointment:  Tilt your head back, and pull your lower eyelid down with one finger.  Drop or squirt the medicine inside the lower lid.  Close your eye for 30 to 60  "seconds to let the drops or ointment move around.  Do not touch the ointment or dropper tip to your eyelashes or any other surface.  Do not share towels, pillows, or washcloths while you have pinkeye.  When should you call for help?   Call your doctor now or seek immediate medical care if:    You have pain in your eye, not just irritation on the surface.     You have a change in vision or loss of vision.     You have an increase in discharge from the eye.     Your eye has not started to improve or begins to get worse within 48 hours after you start using antibiotics.     Pinkeye lasts longer than 7 days.   Watch closely for changes in your health, and be sure to contact your doctor if you have any problems.  Where can you learn more?  Go to https://www.[employer].net/patiented  Enter Y392 in the search box to learn more about \"Pinkeye: Care Instructions.\"  Current as of: June 6, 2023               Content Version: 13.8    7803-2406 [employer].   Care instructions adapted under license by your healthcare professional. If you have questions about a medical condition or this instruction, always ask your healthcare professional. [employer] disclaims any warranty or liability for your use of this information.      This is a viral condition and doesn't require antibiotics.      Dr. Esparza  "

## 2024-06-11 ENCOUNTER — OFFICE VISIT (OUTPATIENT)
Dept: FAMILY MEDICINE | Facility: OTHER | Age: 24
End: 2024-06-11
Attending: STUDENT IN AN ORGANIZED HEALTH CARE EDUCATION/TRAINING PROGRAM
Payer: COMMERCIAL

## 2024-06-11 VITALS
HEIGHT: 67 IN | OXYGEN SATURATION: 98 % | TEMPERATURE: 97.8 F | WEIGHT: 184.8 LBS | SYSTOLIC BLOOD PRESSURE: 109 MMHG | RESPIRATION RATE: 18 BRPM | BODY MASS INDEX: 29 KG/M2 | HEART RATE: 70 BPM | DIASTOLIC BLOOD PRESSURE: 75 MMHG

## 2024-06-11 DIAGNOSIS — R11.10 POST-TUSSIVE EMESIS: ICD-10-CM

## 2024-06-11 DIAGNOSIS — R05.1 ACUTE COUGH: ICD-10-CM

## 2024-06-11 DIAGNOSIS — Z23 NEED FOR VACCINATION: ICD-10-CM

## 2024-06-11 DIAGNOSIS — J02.0 STREP THROAT: Primary | ICD-10-CM

## 2024-06-11 LAB
GROUP A STREP BY PCR: DETECTED
SARS-COV-2 RNA RESP QL NAA+PROBE: NEGATIVE

## 2024-06-11 PROCEDURE — 87798 DETECT AGENT NOS DNA AMP: CPT | Mod: ZL | Performed by: STUDENT IN AN ORGANIZED HEALTH CARE EDUCATION/TRAINING PROGRAM

## 2024-06-11 PROCEDURE — 87635 SARS-COV-2 COVID-19 AMP PRB: CPT | Mod: ZL | Performed by: STUDENT IN AN ORGANIZED HEALTH CARE EDUCATION/TRAINING PROGRAM

## 2024-06-11 PROCEDURE — 87651 STREP A DNA AMP PROBE: CPT | Mod: ZL | Performed by: STUDENT IN AN ORGANIZED HEALTH CARE EDUCATION/TRAINING PROGRAM

## 2024-06-11 PROCEDURE — 90471 IMMUNIZATION ADMIN: CPT | Performed by: STUDENT IN AN ORGANIZED HEALTH CARE EDUCATION/TRAINING PROGRAM

## 2024-06-11 PROCEDURE — 99213 OFFICE O/P EST LOW 20 MIN: CPT | Mod: 25 | Performed by: STUDENT IN AN ORGANIZED HEALTH CARE EDUCATION/TRAINING PROGRAM

## 2024-06-11 PROCEDURE — 90715 TDAP VACCINE 7 YRS/> IM: CPT | Performed by: STUDENT IN AN ORGANIZED HEALTH CARE EDUCATION/TRAINING PROGRAM

## 2024-06-11 RX ORDER — AMOXICILLIN 500 MG/1
500 CAPSULE ORAL 2 TIMES DAILY
Qty: 20 CAPSULE | Refills: 0 | Status: SHIPPED | OUTPATIENT
Start: 2024-06-11 | End: 2024-06-21

## 2024-06-11 RX ORDER — BENZONATATE 100 MG/1
100 CAPSULE ORAL 3 TIMES DAILY PRN
Qty: 20 CAPSULE | Refills: 0 | Status: SHIPPED | OUTPATIENT
Start: 2024-06-11 | End: 2024-06-16

## 2024-06-11 ASSESSMENT — PAIN SCALES - GENERAL: PAINLEVEL: NO PAIN (0)

## 2024-06-11 NOTE — PROGRESS NOTES
Assessment & Plan     (J02.0) Strep throat  (primary encounter diagnosis)    Comment: Positive for strep pharyngitis.  She may also have other viral illness as she does work at a .  Vital signs are stable.  Afebrile.  No difficulty with breathing.  Swallowing appropriately.    Plan: amoxicillin (AMOXIL) 500 MG capsule          Amoxicillin twice a day for 10 days.  Discussed changing toothbrush and washing water bottle.  Follow-up with primary care for any persisting symptoms.  Return to rapid clinic or ER for worsening or changing symptoms.  She is comfortable and agreeable with this plan.    (R05.1) Acute cough  Comment: Strep, could also consider viral illness.  COVID test was negative.  Plan: Group A Streptococcus PCR Throat Swab, B.         pertussis/parapertussis PCR-NP, Symptomatic         COVID-19 Virus (Coronavirus) by PCR Nose,         benzonatate (TESSALON) 100 MG capsule            (Z23) Need for vaccination  Comment: Tdap greater than 10 years, last completed in 4/1/2014.  Plan: Updated today.  Vital signs are stable.  She otherwise appears well.  Agrees to update.    (R11.10) Post-tussive emesis    Comment: Posttussive emesis at night.  Consider this to be related to strep.  Pertussis testing is pending.  Unlikely, but with working at a  testing has been completed.    Plan: B. pertussis/parapertussis PCR-NP                Francisco Koo is a 23 year old, presenting for the following health issues:  Cough    HPI     Patient presents today with five day history cough and congestion. States she works at a  and many of the children have been ill. She has been having some post-tussive emesis, worse at night. She notes some difficulty with sleep due to cough. She has been using Dayquil and Nyquil. She continues to eat/drink appropriately.       Review of Systems  Constitutional, HEENT, cardiovascular, pulmonary, gi and gu systems are negative, except as otherwise noted.       "  Objective    /75 (BP Location: Left arm, Patient Position: Sitting, Cuff Size: Adult Regular)   Pulse 70   Temp 97.8  F (36.6  C) (Temporal)   Resp 18   Ht 1.702 m (5' 7\")   Wt 83.8 kg (184 lb 12.8 oz)   LMP 05/01/2024 (Approximate)   SpO2 98%   Breastfeeding No   BMI 28.94 kg/m    Body mass index is 28.94 kg/m .    Physical Exam   GENERAL: alert and no distress  EYES: Eyes grossly normal to inspection, PERRL and conjunctivae and sclerae normal  HENT: ear canals and TM's normal, nose and mouth without ulcers or lesions, scant erythema of pharynx, no edema or exudates  NECK: shotty anterior cervical adenopathy, no asymmetry, masses, or scars  RESP: lungs clear to auscultation - no rales, rhonchi or wheezes  CV: regular rate and rhythm, normal S1 S2  MS: no gross musculoskeletal defects noted, no edema    Results for orders placed or performed in visit on 06/11/24   Symptomatic COVID-19 Virus (Coronavirus) by PCR Nose     Status: Normal    Specimen: Nose; Swab   Result Value Ref Range    SARS CoV2 PCR Negative Negative    Narrative    Testing was performed using the Xpert Xpress SARS-CoV-2 Assay on the [employer] Gene-Xpert Instrument Systems. Additional information about this Emergency Use Authorization (EUA) assay can be found via the Lab Guide. This test should be ordered for the detection of SARS-CoV-2 in individuals who meet SARS-CoV-2 clinical and/or epidemiological criteria as well as from individuals without symptoms or other reasons to suspect COVID-19. Test performance for asymptomatic patients has only been established in anterior nasal swab specimens. This test is for in vitro diagnostic use under the FDA EUA for laboratories certified under CLIA to perform high complexity testing. This test has not been FDA cleared or approved. A negative result does not rule out the presence of PCR inhibitors in the specimen or target RNA concentration below the limit of detection for the assay. The " possibility of a false negative should be considered if the patient's recent exposure or clinical presentation suggests COVID-19. This test was validated by [facility]. This laboratory is certified under the Clinical Laboratory Improvement Amendments (CLIA) as qualified to perform high complexity clinical laboratory testing.   Group A Streptococcus PCR Throat Swab     Status: Abnormal    Specimen: Throat; Swab   Result Value Ref Range    Group A strep by PCR Detected (A) Not Detected    Narrative    The Xpert Xpress Strep A test, performed on the [employer] Systems, is a rapid, qualitative in vitro diagnostic test for the detection of Streptococcus pyogenes (Group A ß-hemolytic Streptococcus, Strep A) in throat swab specimens from patients with signs and symptoms of pharyngitis. The Xpert Xpress Strep A test can be used as an aid in the diagnosis of Group A Streptococcal pharyngitis. The assay is not intended to monitor treatment for Group A Streptococcus infections. The Xpert Xpress Strep A test utilizes an automated real-time polymerase chain reaction (PCR) to detect Streptococcus pyogenes DNA.         Signed Electronically by: Tamara German PA-C

## 2024-06-11 NOTE — LETTER
June 11, 2024      Hanane Baron  [address] [address] [address]        To Whom It May Concern:    Hanane Baron  was seen on 6/11/24.  Please excuse her from work through 6/12/24 due to illness.        Sincerely,        Tamara German PA-C

## 2024-06-11 NOTE — NURSING NOTE
"Chief Complaint   Patient presents with    Cough   Patient presents to the clinic for a cough. Patient would also like her ears checked as well. Patient has no other concerns.    Initial /75 (BP Location: Left arm, Patient Position: Sitting, Cuff Size: Adult Regular)   Pulse 70   Temp 97.8  F (36.6  C) (Temporal)   Resp 18   Ht 1.702 m (5' 7\")   Wt 83.8 kg (184 lb 12.8 oz)   LMP 05/01/2024 (Approximate)   SpO2 98%   Breastfeeding No   BMI 28.94 kg/m   Estimated body mass index is 28.94 kg/m  as calculated from the following:    Height as of this encounter: 1.702 m (5' 7\").    Weight as of this encounter: 83.8 kg (184 lb 12.8 oz).  Medication Review: complete    The next two questions are to help us understand your food security.  If you are feeling you need any assistance in this area, we have resources available to support you today.          2/16/2024   SDOH- Food Insecurity   Within the past 12 months, did you worry that your food would run out before you got money to buy more? N   Within the past 12 months, did the food you bought just not last and you didn t have money to get more? N         Health Care Directive:  Patient does not have a Health Care Directive or Living Will: Discussed advance care planning with patient; however, patient declined at this time.    Shonda Hill      "

## 2024-06-11 NOTE — LETTER
June 11, 2024      Hanane Baron  [address] [address] [address]        To Whom It May Concern:    Hanane Baron  was seen on 6/11/24.  Please excuse her yesterday and today due to illness. She can return tomorrow.        Sincerely,        Tamara German PA-C

## 2024-06-11 NOTE — PATIENT INSTRUCTIONS
Strep pharyngitis    Antibiotics.    You are contagious for the first 24 hours after starting antibiotic therapy.    After 24 hours, change toothbrush, wash water bottle, and change pillowcase.  This will prevent reinfection.    Continue over-the-counter management including ibuprofen and/or acetaminophen as needed.    Follow-up with primary care for persisting symptoms.    Return to rapid clinic or go to the ER for worsening or changing symptoms.

## 2024-06-13 LAB
B PARAPERT DNA SPEC QL NAA+PROBE: NOT DETECTED
B PERT DNA SPEC QL NAA+PROBE: NOT DETECTED

## 2024-07-13 ENCOUNTER — HEALTH MAINTENANCE LETTER (OUTPATIENT)
Age: 24
End: 2024-07-13
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
## 2024-08-28 ENCOUNTER — OFFICE VISIT (OUTPATIENT)
Dept: FAMILY MEDICINE | Facility: OTHER | Age: 24
End: 2024-08-28
Attending: NURSE PRACTITIONER
Payer: COMMERCIAL

## 2024-08-28 ENCOUNTER — HOSPITAL ENCOUNTER (OUTPATIENT)
Dept: GENERAL RADIOLOGY | Facility: OTHER | Age: 24
Discharge: HOME | End: 2024-08-28
Payer: COMMERCIAL

## 2024-08-28 VITALS
TEMPERATURE: 97.8 F | OXYGEN SATURATION: 98 % | WEIGHT: 189 LBS | SYSTOLIC BLOOD PRESSURE: 110 MMHG | BODY MASS INDEX: 29.6 KG/M2 | DIASTOLIC BLOOD PRESSURE: 70 MMHG | RESPIRATION RATE: 16 BRPM | HEART RATE: 76 BPM

## 2024-08-28 DIAGNOSIS — M54.6 ACUTE LEFT-SIDED THORACIC BACK PAIN: ICD-10-CM

## 2024-08-28 DIAGNOSIS — S20.212A CHEST WALL CONTUSION, LEFT, INITIAL ENCOUNTER: ICD-10-CM

## 2024-08-28 DIAGNOSIS — T14.8XXA BRUISING: ICD-10-CM
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
## 2024-08-28 PROCEDURE — 99213 OFFICE O/P EST LOW 20 MIN: CPT

## 2024-08-28 PROCEDURE — 71101 X-RAY EXAM UNILAT RIBS/CHEST: CPT | Mod: LT

## 2024-08-28 PROCEDURE — 96372 THER/PROPH/DIAG INJ SC/IM: CPT

## 2024-08-28 PROCEDURE — 250N000011 HC RX IP 250 OP 636: Mod: JZ

## 2024-08-28 RX ORDER — KETOROLAC TROMETHAMINE 30 MG/ML
30 INJECTION, SOLUTION INTRAMUSCULAR; INTRAVENOUS ONCE
Status: COMPLETED | OUTPATIENT
Start: 2024-08-28 | End: 2024-08-28

## 2024-08-28 RX ADMIN — KETOROLAC TROMETHAMINE 30 MG: 30 INJECTION, SOLUTION INTRAMUSCULAR at 10:41

## 2024-08-28 ASSESSMENT — PAIN SCALES - GENERAL: PAINLEVEL: SEVERE PAIN (6)
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
## 2024-12-10 ENCOUNTER — OFFICE VISIT (OUTPATIENT)
Dept: FAMILY MEDICINE | Facility: OTHER | Age: 24
End: 2024-12-10
Attending: STUDENT IN AN ORGANIZED HEALTH CARE EDUCATION/TRAINING PROGRAM
Payer: COMMERCIAL

## 2024-12-10 VITALS
RESPIRATION RATE: 20 BRPM | BODY MASS INDEX: 28.95 KG/M2 | HEIGHT: 68 IN | SYSTOLIC BLOOD PRESSURE: 140 MMHG | OXYGEN SATURATION: 100 % | TEMPERATURE: 97.9 F | HEART RATE: 81 BPM | DIASTOLIC BLOOD PRESSURE: 98 MMHG | WEIGHT: 191 LBS

## 2024-12-10 DIAGNOSIS — J10.1 INFLUENZA A: Primary | ICD-10-CM

## 2024-12-10 DIAGNOSIS — R19.7 DIARRHEA, UNSPECIFIED TYPE: ICD-10-CM

## 2024-12-10 DIAGNOSIS — R50.9 FEVER IN ADULT: ICD-10-CM

## 2024-12-10 DIAGNOSIS — R68.83 CHILLS: ICD-10-CM

## 2024-12-10 LAB
FLUAV RNA SPEC QL NAA+PROBE: POSITIVE
FLUBV RNA RESP QL NAA+PROBE: NEGATIVE
GROUP A STREP BY PCR: NOT DETECTED
RSV RNA SPEC NAA+PROBE: NEGATIVE
SARS-COV-2 RNA RESP QL NAA+PROBE: NEGATIVE

## 2024-12-10 PROCEDURE — 87651 STREP A DNA AMP PROBE: CPT | Mod: ZL | Performed by: STUDENT IN AN ORGANIZED HEALTH CARE EDUCATION/TRAINING PROGRAM

## 2024-12-10 PROCEDURE — 87637 SARSCOV2&INF A&B&RSV AMP PRB: CPT | Mod: ZL | Performed by: STUDENT IN AN ORGANIZED HEALTH CARE EDUCATION/TRAINING PROGRAM

## 2024-12-10 ASSESSMENT — LIFESTYLE VARIABLES
SKIP TO QUESTIONS 9-10: 0
HOW OFTEN DO YOU HAVE A DRINK CONTAINING ALCOHOL: 2-4 TIMES A MONTH
HOW MANY STANDARD DRINKS CONTAINING ALCOHOL DO YOU HAVE ON A TYPICAL DAY: 5 OR 6
HOW OFTEN DO YOU HAVE SIX OR MORE DRINKS ON ONE OCCASION: MONTHLY
AUDIT-C TOTAL SCORE: 6

## 2024-12-10 ASSESSMENT — PAIN SCALES - GENERAL: PAINLEVEL_OUTOF10: SEVERE PAIN (7)

## 2024-12-10 NOTE — PROGRESS NOTES
"  Assessment & Plan     (J10.1) Influenza A  (primary encounter diagnosis)    Comment: Positive for influenza A.  Approximately 48 hours into symptoms.  Vital signs are stable.  No indication at this time for Tamiflu.  She overall appears well.  Diarrhea has since resolved.  Otherwise tolerating oral intake.    Plan: Influenza A/B, RSV and SARS-CoV2 PCR (COVID-19)        Nose, Group A Streptococcus PCR Throat Swab          Continue resting.  Over-the-counter management.  Follow-up with PCP if any persistent symptoms occur.  Return to [facility] clinic or ER if symptoms worsen or change in the meantime.  She is comfortable and agreeable with this plan.      (R19.7) Diarrhea, unspecified type  Comment: Influenza A.  Plan: Influenza A/B, RSV and SARS-CoV2 PCR (COVID-19)        Nose            (R50.9) Fever in adult  Comment: Influenza A.  Plan: Influenza A/B, RSV and SARS-CoV2 PCR (COVID-19)        Nose, Group A Streptococcus PCR Throat Swab            (R68.83) Chills  Comment: Influenza A.  Plan: Influenza A/B, RSV and SARS-CoV2 PCR (COVID-19)        Nose, Group A Streptococcus PCR Throat Swab          Francisco Koo is a 24 year old, presenting for the following health issues:  Cough (X 2 days), Chills (With fever), Headache (Behind R ear pain), and Diarrhea (yesterday)    HPI     Patient presents today with concerns of fever, headache, congestion, sore throat. She also had diarrhea yesterday, better today. Continues to be fatigued, feel unwell. She has been using Dayquil, ibuprofen. These do help intermittently. 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       Review of Systems  Constitutional, HEENT, cardiovascular, pulmonary, gi and gu systems are negative, except as otherwise noted.        Objective    BP (!) 140/98 (BP Location: Left arm, Patient Position: Sitting, Cuff Size: Adult Regular)   Pulse 81   Temp 97.9  F (36.6  C) (Tympanic)   Resp 20   Ht 1.715 m (5' 7.5\")   Wt 86.6 kg (191 " lb)   LMP 12/03/2024 (Exact Date)   SpO2 100%   BMI 29.47 kg/m    Body mass index is 29.47 kg/m .    Physical Exam   GENERAL: alert and no distress  EYES: Eyes grossly normal to inspection, PERRL and conjunctivae and sclerae normal  HENT: ear canals and TM's normal, nose and mouth without ulcers or lesions  NECK: no adenopathy, no asymmetry, masses, or scars  RESP: lungs clear to auscultation - no rales, rhonchi or wheezes  CV: regular rate and rhythm, normal S1 S2, no S3 or S4, no murmur, click or rub, no peripheral edema  MS: no gross musculoskeletal defects noted, no edema    Results for orders placed or performed in visit on 12/10/24   Influenza A/B, RSV and SARS-CoV2 PCR (COVID-19) Nose     Status: Abnormal    Specimen: Nose; Swab   Result Value Ref Range    Influenza A PCR Positive (A) Negative    Influenza B PCR Negative Negative    RSV PCR Negative Negative    SARS CoV2 PCR Negative Negative    Narrative    Testing was performed using the Xpert Xpress CoV2/Flu/RSV Assay on the [employer] Gene[employer]pert Instrument. This test should be ordered for the detection of SARS-CoV2, influenza, and RSV viruses in individuals with signs and symptoms of respiratory tract infection. This test is for in vitro diagnostic use under the [place] FDA for laboratories certified under CLIA to perform high or moderate complexity testing. This test has been [place] FDA cleared. A negative result does not rule out the presence of PCR inhibitors in the specimen or target RNA in concentration below the limit of detection for the assay. If only one viral target is positive but coinfection with multiple targets is suspected, the sample should be re-tested with another FDA cleared, approved, or authorized test, if coninfection would change clinical management. This test was validated by the [facility] [employer]. These laboratories are certified under the Clinical Laboratory Improvement Amendments of 1988 (CLIA-88) as qualified to perfom high  complexity laboratory testing.   Group A Streptococcus PCR Throat Swab     Status: Normal    Specimen: Throat; Swab   Result Value Ref Range    Group A strep by PCR Not Detected Not Detected    Narrative    The Xpert Xpress Strep A test, performed on the [employer], is a rapid, qualitative in vitro diagnostic test for the detection of Streptococcus pyogenes (Group A ß-hemolytic Streptococcus, Strep A) in throat swab specimens from patients with signs and symptoms of pharyngitis. The Xpert Xpress Strep A test can be used as an aid in the diagnosis of Group A Streptococcal pharyngitis. The assay is not intended to monitor treatment for Group A Streptococcus infections. The Xpert Xpress Strep A test utilizes an automated real-time polymerase chain reaction (PCR) to detect Streptococcus pyogenes DNA.         Signed Electronically by: Tamara German PA-C

## 2024-12-10 NOTE — LETTER
December 10, 2024      Hanane Baron  [address] [address] [address]        To Whom It May Concern:    Hanane Baron  was seen on 12/10/24.  Please excuse her yesterday, today, and possibly tomorrow due to illness.        Sincerely,        Tamara German PA-C

## 2024-12-10 NOTE — NURSING NOTE
"Chief Complaint   Patient presents with    Cough     X 2 days    Chills     Patient tx with ibuprofen and dayquil  All sx started 2 days ago -   Except stomach cramps started today.  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    Initial BP (!) 140/98 (BP Location: Left arm, Patient Position: Sitting, Cuff Size: Adult Regular)   Pulse 81   Temp 97.9  F (36.6  C) (Tympanic)   Resp 20   Ht 1.715 m (5' 7.5\")   Wt 86.6 kg (191 lb)   LMP 12/03/2024 (Exact Date)   SpO2 100%   BMI 29.47 kg/m   Estimated body mass index is 29.47 kg/m  as calculated from the following:    Height as of this encounter: 1.715 m (5' 7.5\").    Weight as of this encounter: 86.6 kg (191 lb).     Advance Care Directive on file? n    FOOD SECURITY SCREENING QUESTIONS:    The next two questions are to help us understand your food security.  If you are feeling you need any assistance in this area, we have resources available to support you today.    Hunger Vital Signs:  Within the past 12 months we worried whether our food would run out before we got money to buy more. Never  Within the past 12 months the food we bought just didn't last and we didn't have money to get more. Never  Savana Lucas LPN,ROBBY on 12/10/2024 at 12:51 PM      Savana Lucas LPN     "

## 2025-07-19 ENCOUNTER — HEALTH MAINTENANCE LETTER (OUTPATIENT)
Age: 25
End: 2025-07-19

## (undated) RX ORDER — ONDANSETRON 4 MG/1
TABLET, ORALLY DISINTEGRATING ORAL
Status: DISPENSED
Start: 2023-05-03

## (undated) RX ORDER — KETOROLAC TROMETHAMINE 30 MG/ML
INJECTION, SOLUTION INTRAMUSCULAR; INTRAVENOUS
Status: DISPENSED
Start: 2024-08-28